# Patient Record
Sex: MALE | Race: WHITE | Employment: OTHER | URBAN - METROPOLITAN AREA
[De-identification: names, ages, dates, MRNs, and addresses within clinical notes are randomized per-mention and may not be internally consistent; named-entity substitution may affect disease eponyms.]

---

## 2017-01-17 ENCOUNTER — OFFICE VISIT (OUTPATIENT)
Dept: FAMILY MEDICINE CLINIC | Age: 77
End: 2017-01-17

## 2017-01-17 ENCOUNTER — HOSPITAL ENCOUNTER (OUTPATIENT)
Dept: LAB | Age: 77
Discharge: HOME | End: 2017-01-17
Payer: MEDICARE

## 2017-01-17 VITALS
TEMPERATURE: 97.3 F | WEIGHT: 147.2 LBS | OXYGEN SATURATION: 100 % | RESPIRATION RATE: 16 BRPM | HEART RATE: 64 BPM | SYSTOLIC BLOOD PRESSURE: 116 MMHG | HEIGHT: 65 IN | DIASTOLIC BLOOD PRESSURE: 69 MMHG | BODY MASS INDEX: 24.53 KG/M2

## 2017-01-17 DIAGNOSIS — I10 ESSENTIAL HYPERTENSION: ICD-10-CM

## 2017-01-17 DIAGNOSIS — N18.3 CKD (CHRONIC KIDNEY DISEASE), STAGE 3 (MODERATE): ICD-10-CM

## 2017-01-17 DIAGNOSIS — F43.29 STRESS AND ADJUSTMENT REACTION: ICD-10-CM

## 2017-01-17 DIAGNOSIS — Z78.9: ICD-10-CM

## 2017-01-17 DIAGNOSIS — Z13.220 LIPID SCREENING: ICD-10-CM

## 2017-01-17 DIAGNOSIS — C61 PROSTATE CA (HCC): ICD-10-CM

## 2017-01-17 DIAGNOSIS — Z78.9 HEALTH MAINTENANCE ALTERATION: Primary | ICD-10-CM

## 2017-01-17 LAB
ALBUMIN UR QL STRIP: 30 MG/L
CREATININE, URINE POC: 50 MG/DL
MICROALBUMIN/CREAT RATIO POC: NORMAL MG/G

## 2017-01-17 PROCEDURE — 85027 COMPLETE CBC AUTOMATED: CPT

## 2017-01-17 PROCEDURE — 80053 COMPREHEN METABOLIC PANEL: CPT

## 2017-01-17 PROCEDURE — 84153 ASSAY OF PSA TOTAL: CPT

## 2017-01-17 PROCEDURE — 82465 ASSAY BLD/SERUM CHOLESTEROL: CPT

## 2017-01-17 PROCEDURE — 83718 ASSAY OF LIPOPROTEIN: CPT

## 2017-01-17 PROCEDURE — 83721 ASSAY OF BLOOD LIPOPROTEIN: CPT

## 2017-01-17 PROCEDURE — 36415 COLL VENOUS BLD VENIPUNCTURE: CPT

## 2017-01-17 RX ORDER — AMLODIPINE BESYLATE 5 MG/1
TABLET ORAL
Qty: 90 TAB | Refills: 3 | Status: SHIPPED | OUTPATIENT
Start: 2017-01-17 | End: 2018-03-08 | Stop reason: SDUPTHER

## 2017-01-17 RX ORDER — ATENOLOL 50 MG/1
TABLET ORAL
Qty: 90 TAB | Refills: 3 | Status: SHIPPED | OUTPATIENT
Start: 2017-01-17 | End: 2018-04-12 | Stop reason: ALTCHOICE

## 2017-01-17 NOTE — PROGRESS NOTES
Army Hogan is a 67 y.o. male      Issues discussed today include:        Signs and symptoms:  remendous stress  Duration:  weeks  Context:  His wife had knee injury then developed a PE and had lenghty hospitalization and rehab. She is still out of town in [place]  Location:  Patient is living with his son in [place] most days but commutes back home twice a week  Quality:  ++stress but no despair  Severity:  no SI/HI  Timing:  She is getting gbetter and he feels more at ease  Modifying factors:  He declined Rx or counseling    Data reviewed or ordered today:  labs    WELLNESS 2017    PSA: 2015 <0.1     Colonoscopy:   FOBT:   TDAP: 2015  Pneumovax:   Flu shot:   Eye exam:   EK-14     FTF for DME: done: none  Advanced directive: Full code  Specialists: Armida Hadley  CKD Makayla Bocanegra fax #[phone]  GI Rian    Other problems include:  Patient Active Problem List   Diagnosis Code    HTN (hypertension) I10    Gout M10.9    ED (erectile dysfunction) N52.9    Microcytosis R71.8    THALASSEMIA MINOR     Prostatism N40.0    Prostate CA ([address] C61    S/P colonoscopy Z98.890    Esophagitis K20.9    Diverticula of colon K57.30    Anemia D64.9    Nonspecific abnormal electrocardiogram (ECG) (EKG) R94.31    CKD (chronic kidney disease) N18.9    Microalbuminuria R80.9    Screening for AAA (abdominal aortic aneurysm) Z13.6    Age appropriate mental status Z78.9       Medications:  Current Outpatient Prescriptions   Medication Sig Dispense Refill    atenolol (TENORMIN) 50 mg tablet TAKE 1 TABLET DAILY (NEED OFFICE VISIT) 90 Tab 3    amLODIPine (NORVASC) 5 mg tablet TAKE 1 TABLET DAILY (NEED OFFICE VISIT) 90 Tab 3    allopurinol (ZYLOPRIM) 300 mg tablet TAKE 1 TABLET DAILY 90 Tab 3       Allergies: Allergies   Allergen Reactions    Lisinopril Other (comments)     Increase creatinine    Probenecid Other (comments)     decrease renal function       LMP:  No LMP for male patient.     Social History Social History    Marital status:      Spouse name: N/A    Number of children: N/A    Years of education: N/A     Occupational History    Not on file. Social History Main Topics    Smoking status: Never Smoker    Smokeless tobacco: Never Used    Alcohol use No    Drug use: No    Sexual activity: Yes     Partners: Female     Other Topics Concern    Not on file     Social History Narrative         History reviewed. No pertinent family history. Other family history:  Wife ill currently    Meaningful use:  done      ROS:  Headaches:  no  Chest Pain:  no  SOB:  no  Fevers:  no  Other significant ROS:  No SI/HI, no falls or depression, MMSE normal today    No LMP for male patient. Physical Exam  Visit Vitals    /69 (BP 1 Location: Left arm, BP Patient Position: Sitting)    Pulse 64    Temp 97.3 °F (36.3 °C) (Oral)    Resp 16    Ht 5' 5\" (1.651 m)    Wt 147 lb 3.2 oz (66.8 kg)    SpO2 100%    BMI 24.5 kg/m2     BP Readings from Last 3 Encounters:   01/17/17 116/69   11/12/16 114/70   11/06/15 112/59     Constitutional:  Appears well,  No Acute Distress, Vitals noted  Psychiatric:   Affect normal, Alert and Oriented to person/place/time    Eyes:   Pupils equally round and reactive, EOMI, conjunctiva clear, eyelids normal  ENT:   External ears and nose normal/lips, teeth=OK/gums normal, TMs and Orophyarynx normal  Neck:   general inspection and Thyroid normal.  No abnormal cervical or supraclavicular nodes    Lungs:   clear to auscultation, good respiratory effort  Heart: Ausculation normal.  Regular rhythm. No cardiac murmurs.   No carotid bruits or palpable thrills  Chest wall normal    Extremities:   without edema, good peripheral pulses  Skin:   Warm to palpation, without rashes, bruising, or suspicious lesions     Neuro:  No facial droop, speech fluent, EOMI, Pupils equally round and reactive to light, visual fields seem OK, hearing seems normal and symmetrical,smile symmetrical, puffs out cheeks symmetrically    Shoulder shrug symmetrical     moves all extremities, strength/sensationseem intact and symmetrical    Rapid alternating movements of hands normal and symmetrical    balance seems OK, no pronator drift, gait normal. \"get up and go\" test OK    squats OK, heel standing/toe standing OK    no tenderness of C spine, T spine, LS spine, flexion/extension of spine OK    Affect seems appropriate, no obvious mental processing problems    MSK:  Full ROM all joints    MMSE normal            Assessment:    Patient Active Problem List   Diagnosis Code    HTN (hypertension) I10    Gout M10.9    ED (erectile dysfunction) N52.9    Microcytosis R71.8    THALASSEMIA MINOR     Prostatism N40.0    Prostate CA ([address] C61    S/P colonoscopy Z98.890    Esophagitis K20.9    Diverticula of colon K57.30    Anemia D64.9    Nonspecific abnormal electrocardiogram (ECG) (EKG) R94.31    CKD (chronic kidney disease) N18.9    Microalbuminuria R80.9    Screening for AAA (abdominal aortic aneurysm) Z13.6    Age appropriate mental status Z78.9       Today's diagnoses are:    ICD-10-CM ICD-9-CM    1. Health maintenance alteration Z78.9 V49.89    2. Prostate CA (HCC) C61 185 PSA DIAGNOSTIC (PROSTATIC SPECIFIC AG)   3. CKD (chronic kidney disease), stage 3 (moderate) N18.3 585.3 CBC W/O DIFF      METABOLIC PANEL, COMPREHENSIVE      AMB POC URINE, MICROALBUMIN, SEMIQUANT (3 RESULTS)   4. Lipid screening Z13.220 V77.91 LDL, DIRECT      CHOLESTEROL, HDL      CHOLESTEROL, TOTAL   5. Age appropriate mental status Z78.9 V49.89    6. Stress and adjustment reaction F43.29 309.89 REFERRAL TO [facility] PROGRAMS    wife s/p PE and now in rehab in [facility]   7.  Essential hypertension I10 401.9 atenolol (TENORMIN) 50 mg tablet      amLODIPine (NORVASC) 5 mg tablet       Plan:  Orders Placed This Encounter    PSA - PROSTATE SPECIFIC AG    CBC W/O DIFF    METABOLIC PANEL, COMPREHENSIVE    LDL, DIRECT    CHOLESTEROL, HDL    CHOLESTEROL, TOTAL    REFERRAL TO [facility] PROGRAMS     Referral Priority:   Routine     Referral Type:   Consultation     Referral Reason:   Specialty Services Required    AMB POC URINE, MICROALBUMIN, SEMIQUANT (3 RESULTS)    atenolol (TENORMIN) 50 mg tablet     Sig: TAKE 1 TABLET DAILY (NEED OFFICE VISIT)     Dispense:  90 Tab     Refill:  3    amLODIPine (NORVASC) 5 mg tablet     Sig: TAKE 1 TABLET DAILY (NEED OFFICE VISIT)     Dispense:  90 Tab     Refill:  3       See patient instructions  Patient Instructions   In general, I advise patients to be as active as possible. I believe exercise is the key to long life and good health. The current recommendation is for individuals to exercise for 150 minutes each week (in other words 30 minutes 5 days a week). Exercise should be vigorous enough to work up a sweat. These activities include brisk walking, running, tennis, swimming, weight-lifting, etc.     I usually tell folks that work is work and exercise is exercise. Each of these activities has a different goal.  So build dedicated exercise time into your routine. Eat more protein (egg whites, beans, and nuts you know for sure that you tolerate) and less carbohydrates (white bread, white rice, white pasta, white potatoes, sodas, and sweets). Eat appropriately small portion sizes. You may also wish to look into the Mediterranean or the DASH Diet:     If any patient drinks alcohol, I typically suggest that a male drink no more than 2 beers (or glasses of wine, or shots of liquor) in any 24 hour period (and not daily). For females, the limits are one drink per 24 hours (and not daily). After these limits, the toxic effects of alcohol consumption start to manifest.     Avoid tobacco products. I can provide separate instructions for smoking cessation strategies if needed.     I suggest a wellness exam yearly during your birth month to update health maintenance issues such as fasting labs, EKGs and other studies, appropriate cancer screenings,  immunizations, etc.      I suggest a yearly flu shot    If needed, please call Anjelica Stevens to help arrange and authorize any tests or referrals. Her # is [phone]. Tests at [facility] can be scheduled by calling #[phone].     Follow up with your specialists    Your SLUMS test for mentation was normal today              refresh note:  done    AVS Printed:  done    Fax labs to Dr. Kenyatta Perez

## 2017-01-17 NOTE — MR AVS SNAPSHOT
Visit Information Date & Time Provider Department Dept. Phone Encounter #  
 1/17/2017  8:15 AM Markie Min MD [address] [place] [address] [phone] [id number] Upcoming Health Maintenance Date Due  
 GLAUCOMA SCREENING Q2Y 9/14/2017 MEDICARE YEARLY EXAM 1/18/2018 DTaP/Tdap/Td series (2 - Td) 9/14/2025 Allergies as of 1/17/2017  Review Complete On: 1/17/2017 By: Trip Lopez LPN Severity Noted Reaction Type Reactions Lisinopril  06/11/2010    Other (comments) Increase creatinine Probenecid  06/11/2010    Other (comments) decrease renal function Current Immunizations  Reviewed on 11/12/2016 Name Date Influenza High Dose Vaccine PF 11/12/2016, 9/14/2015 Influenza Vaccine Split 10/23/2008 Pneumococcal Vaccine (Pcv) 10/4/2007 Tdap 9/14/2015 Tetanus Vaccine 10/4/2007 Not reviewed this visit You Were Diagnosed With   
  
 Codes Comments Health maintenance alteration    -  Primary ICD-10-CM: Z78.9 ICD-9-CM: V49.89 Prostate [facility])     ICD-10-CM: A77 ICD-9-CM: 292 CKD (chronic kidney disease), stage 3 (moderate)     ICD-10-CM: N18.3 ICD-9-CM: 876. 3 Lipid screening     ICD-10-CM: Q43.728 ICD-9-CM: V77.91 Age appropriate mental status     ICD-10-CM: Z68.5 ICD-9-CM: V49.89 Vitals BP Pulse Temp Resp Height(growth percentile) Weight(growth percentile) 116/69 (BP 1 Location: Left arm, BP Patient Position: Sitting) 64 97.3 °F (36.3 °C) (Oral) 16 5' 5\" (1.651 m) 147 lb 3.2 oz (66.8 kg) SpO2 BMI Smoking Status 100% 24.5 kg/m2 Never Smoker Vitals History BMI and BSA Data Body Mass Index Body Surface Area 24.5 kg/m 2 1.75 m 2 Preferred Pharmacy Pharmacy Name Phone [address] [facility] [phone] Your Updated Medication List  
  
   
 This list is accurate as of: 1/17/17  9:16 AM.  Always use your most recent med list.  
  
  
  
  
 allopurinol 300 mg tablet Commonly known as:  ZYLOPRIM  
TAKE 1 TABLET DAILY  
  
 amLODIPine 5 mg tablet Commonly known as:  Chávez Fanti TAKE 1 TABLET DAILY (NEED OFFICE VISIT) atenolol 50 mg tablet Commonly known as:  TENORMIN  
TAKE 1 TABLET DAILY (NEED OFFICE VISIT) We Performed the Following AMB POC URINE, MICROALBUMIN, SEMIQUANT (3 RESULTS) [60643 CPT(R)] CBC W/O DIFF [88307 CPT(R)] CHOLESTEROL, HDL [id number] CPT(R)] CHOLESTEROL, TOTAL [77587 CPT(R)] LDL, DIRECT [id number] CPT(R)] METABOLIC PANEL, COMPREHENSIVE [08258 CPT(R)] PSA DIAGNOSTIC (PROSTATIC SPECIFIC AG) [id number] CPT(R)] Patient Instructions In general, I advise patients to be as active as possible. I believe exercise is the key to long life and good health. The current recommendation is for individuals to exercise for 150 minutes each week (in other words 30 minutes 5 days a week). Exercise should be vigorous enough to work up a sweat. These activities include brisk walking, running, tennis, swimming, weight-lifting, etc.  
 
I usually tell folks that work is work and exercise is exercise. Each of these activities has a different goal.  So build dedicated exercise time into your routine. Eat more protein (egg whites, beans, and nuts you know for sure that you tolerate) and less carbohydrates (white bread, white rice, white pasta, white potatoes, sodas, and sweets). Eat appropriately small portion sizes. You may also wish to look into the Mediterranean or the DASH Diet:  
 
If any patient drinks alcohol, I typically suggest that a male drink no more than 2 beers (or glasses of wine, or shots of liquor) in any 24 hour period (and not daily). For females, the limits are one drink per 24 hours (and not daily).   After these limits, the toxic effects of alcohol consumption start to manifest.  
 Avoid tobacco products. I can provide separate instructions for smoking cessation strategies if needed. I suggest a wellness exam yearly during your birth month to update health maintenance issues such as fasting labs, EKGs and other studies, appropriate cancer screenings,  immunizations, etc.   
 
I suggest a yearly flu shot If needed, please call Dwaine Edwards to help arrange and authorize any tests or referrals. Her # is [phone]. Tests at [facility] can be scheduled by calling #[phone]. Follow up with your specialists Your [facility] test for mentation was normal today Introducing [facility]! Dear Jani Severs: Thank you for requesting a [employer] account. Our records indicate that you already have an active [employer]outNow account. You can access your account anytime at [email or web address] Did you know that you can access your hospital and ER discharge instructions at any time in [employer]Now? You can also review all of your test results from your hospital stay or ER visit. Additional Information If you have questions, please visit the Frequently Asked Questions section of the [employer] website at [email or web address] Remember, [employer]Now is NOT to be used for urgent needs. For medical emergencies, dial 911. Now available from your iPhone and Android! Please provide this summary of care documentation to your next provider. Your primary care clinician is listed as Genesis Denise. If you have any questions after today's visit, please call [phone].

## 2017-01-17 NOTE — PATIENT INSTRUCTIONS
In general, I advise patients to be as active as possible. I believe exercise is the key to long life and good health. The current recommendation is for individuals to exercise for 150 minutes each week (in other words 30 minutes 5 days a week). Exercise should be vigorous enough to work up a sweat. These activities include brisk walking, running, tennis, swimming, weight-lifting, etc.     I usually tell folks that work is work and exercise is exercise. Each of these activities has a different goal.  So build dedicated exercise time into your routine. Eat more protein (egg whites, beans, and nuts you know for sure that you tolerate) and less carbohydrates (white bread, white rice, white pasta, white potatoes, sodas, and sweets). Eat appropriately small portion sizes. You may also wish to look into the Mediterranean or the DASH Diet:     If any patient drinks alcohol, I typically suggest that a male drink no more than 2 beers (or glasses of wine, or shots of liquor) in any 24 hour period (and not daily). For females, the limits are one drink per 24 hours (and not daily). After these limits, the toxic effects of alcohol consumption start to manifest.     Avoid tobacco products. I can provide separate instructions for smoking cessation strategies if needed. I suggest a wellness exam yearly during your birth month to update health maintenance issues such as fasting labs, EKGs and other studies, appropriate cancer screenings,  immunizations, etc.      I suggest a yearly flu shot    If needed, please call Riri Em to help arrange and authorize any tests or referrals. Her # is [phone]. Tests at [facility] facilities can be scheduled by calling #[phone].     Follow up with your specialists    Your SLUMS test for mentation was normal today

## 2017-01-17 NOTE — LETTER
1/18/2017 8:30 AM 
 
Mr. Curly Barger [address] Dear Curly Barger: 
 
Please find your most recent results below. Resulted Orders PSA DIAGNOSTIC (PROSTATIC SPECIFIC AG) Result Value Ref Range Prostate Specific Ag <0.1 0.0 - 4.0 ng/mL Comment:  
   Roche ECLIA methodology. According to the American Urological Association, Serum PSA should 
decrease and remain at undetectable levels after radical 
prostatectomy. The AUA defines biochemical recurrence as an initial 
PSA value 0.2 ng/mL or greater followed by a subsequent confirmatory PSA value 0.2 ng/mL or greater. Values obtained with different assay methods or kits cannot be used 
interchangeably. Results cannot be interpreted as absolute evidence 
of the presence or absence of malignant disease. Narrative Performed at:  [address]  [id number] : Douglas Shelby MD, Phone:  [phone] CBC W/O DIFF Result Value Ref Range WBC 7.4 3.4 - 10.8 x10E3/uL  
 RBC 5.51 4.14 - 5.80 x10E6/uL HGB 10.2 (L) 12.6 - 17.7 g/dL HCT 32.4 (L) 37.5 - 51.0 % MCV 59 (L) 79 - 97 fL  
 MCH 18.5 (L) 26.6 - 33.0 pg  
 MCHC 31.5 31.5 - 35.7 g/dL RDW 21.7 (H) 12.3 - 15.4 % PLATELET 482 027 - 733 x10E3/uL Narrative Performed at:  [address]  [id number] : Douglas Shelby MD, Phone:  [phone] METABOLIC PANEL, COMPREHENSIVE Result Value Ref Range Glucose 110 (H) 65 - 99 mg/dL BUN 42 (H) 8 - 27 mg/dL Creatinine 1.55 (H) 0.76 - 1.27 mg/dL GFR est non-AA 43 (L) >59 mL/min/1.73 GFR est AA 49 (L) >59 mL/min/1.73  
 BUN/Creatinine ratio 27 (H) 10 - 22 Sodium 140 134 - 144 mmol/L Potassium 4.2 3.5 - 5.2 mmol/L Chloride 100 96 - 106 mmol/L  
 CO2 21 18 - 29 mmol/L Calcium 9.8 8.6 - 10.2 mg/dL Protein, total 6.9 6.0 - 8.5 g/dL Albumin 4.6 3.5 - 4.8 g/dL GLOBULIN, TOTAL 2.3 1.5 - 4.5 g/dL A-G Ratio 2.0 1.1 - 2.5 Bilirubin, total 0.4 0.0 - 1.2 mg/dL Alk. phosphatase 64 39 - 117 IU/L  
 AST 22 0 - 40 IU/L  
 ALT 21 0 - 44 IU/L Narrative Performed at:  [address]  [id number] : Froilan Weiner MD, Phone:  [phone] LDL, DIRECT Result Value Ref Range LDL,Direct 109 (H) 0 - 99 mg/dL Narrative Performed at:  [address]  [id number] : Froilan Weiner MD, Phone:  [phone] CHOLESTEROL, HDL Result Value Ref Range HDL Cholesterol 54 >39 mg/dL Narrative Performed at:  [address]  [id number] : Froilan Weiner MD, Phone:  [phone] CHOLESTEROL, TOTAL Result Value Ref Range Cholesterol, total 170 100 - 199 mg/dL Narrative Performed at:  [address]  [id number] : Froilan Weiner MD, Phone:  [phone] AMB POC URINE, MICROALBUMIN, SEMIQUANT (3 RESULTS) Result Value Ref Range ALBUMIN, URINE POC 30 Negative mg/L  
 CREATININE, URINE POC 50 mg/dL Microalbumin/creat ratio (POC)  mg/g Comment:  
   Abnormal  
CKD REPORT Result Value Ref Range Interpretation Note Comment:  
   Supplement report is available. Narrative Performed at:  [facility] [place] [address]  [id number] : Estelle Barrett PhD, Phone:  [phone] CVD REPORT Result Value Ref Range INTERPRETATION NTAP Narrative Performed at:  [address] [place] [address]  [id number] : Estelle Barrett PhD, Phone:  [phone] Your anemia is a bit worse this year.  Did you get a colonoscopy in 2015?  I see that a referral was done but I see no report from Dr. Hand Stacks you check with Dr. Twila Hastings (#[phone]) to check on this? Return your stool cards so we can check your stool for blood. Your kidney function is about the same.  I will fax these labs tod Dr. Chaz Busby. Your cholesterol is a bit high. Focus on regular exercise (150 minutes each week) and healthy eating.  Eat more fruits and vegetables.  Eat more protein (egg whites, beans, and nuts you know you tolerate) and less carbohydrates (white bread, white rice, white pasta, white potatoes, sodas, and sweets).  Eat appropriately small portion sizes. Sincerely, Bari Arcos MD

## 2017-01-18 PROBLEM — E78.9 LIPID DISORDER: Status: ACTIVE | Noted: 2017-01-18

## 2017-01-18 LAB
ALBUMIN SERPL-MCNC: 4.6 G/DL (ref 3.5–4.8)
ALBUMIN/GLOB SERPL: 2 {RATIO} (ref 1.1–2.5)
ALP SERPL-CCNC: 64 IU/L (ref 39–117)
ALT SERPL-CCNC: 21 IU/L (ref 0–44)
AST SERPL-CCNC: 22 IU/L (ref 0–40)
BILIRUB SERPL-MCNC: 0.4 MG/DL (ref 0–1.2)
BUN SERPL-MCNC: 42 MG/DL (ref 8–27)
BUN/CREAT SERPL: 27 (ref 10–22)
CALCIUM SERPL-MCNC: 9.8 MG/DL (ref 8.6–10.2)
CHLORIDE SERPL-SCNC: 100 MMOL/L (ref 96–106)
CHOLEST SERPL-MCNC: 170 MG/DL (ref 100–199)
CO2 SERPL-SCNC: 21 MMOL/L (ref 18–29)
CREAT SERPL-MCNC: 1.55 MG/DL (ref 0.76–1.27)
ERYTHROCYTE [DISTWIDTH] IN BLOOD BY AUTOMATED COUNT: 21.7 % (ref 12.3–15.4)
GLOBULIN SER CALC-MCNC: 2.3 G/DL (ref 1.5–4.5)
GLUCOSE SERPL-MCNC: 110 MG/DL (ref 65–99)
HCT VFR BLD AUTO: 32.4 % (ref 37.5–51)
HDLC SERPL-MCNC: 54 MG/DL
HGB BLD-MCNC: 10.2 G/DL (ref 12.6–17.7)
INTERPRETATION, 910389: NORMAL
INTERPRETATION: NORMAL
LDLC SERPL DIRECT ASSAY-MCNC: 109 MG/DL (ref 0–99)
MCH RBC QN AUTO: 18.5 PG (ref 26.6–33)
MCHC RBC AUTO-ENTMCNC: 31.5 G/DL (ref 31.5–35.7)
MCV RBC AUTO: 59 FL (ref 79–97)
PLATELET # BLD AUTO: 179 X10E3/UL (ref 150–379)
POTASSIUM SERPL-SCNC: 4.2 MMOL/L (ref 3.5–5.2)
PROT SERPL-MCNC: 6.9 G/DL (ref 6–8.5)
PSA SERPL-MCNC: <0.1 NG/ML (ref 0–4)
RBC # BLD AUTO: 5.51 X10E6/UL (ref 4.14–5.8)
SODIUM SERPL-SCNC: 140 MMOL/L (ref 134–144)
WBC # BLD AUTO: 7.4 X10E3/UL (ref 3.4–10.8)

## 2017-01-18 NOTE — PROGRESS NOTES
Your anemia is a bit worse this year. Did you get a colonoscopy in 2015? I see that a referral was done but I see no report from Dr. Terrence Hawkins. Can you check with Dr. Terrence Hawkins (#[phone]) to check on this? Return your stool cards so we can check your stool for blood. Your kidney function is about the same. I will fax these labs tod Dr. Lavinia Merida. Your cholesterol is a bit high. Focus on regular exercise (150 minutes each week) and healthy eating. Eat more fruits and vegetables. Eat more protein (egg whites, beans, and nuts you know you tolerate) and less carbohydrates (white bread, white rice, white pasta, white potatoes, sodas, and sweets). Eat appropriately small portion sizes.

## 2017-01-24 ENCOUNTER — HOSPITAL ENCOUNTER (OUTPATIENT)
Dept: LAB | Age: 77
Discharge: HOME | End: 2017-01-24
Payer: MEDICARE

## 2017-01-24 PROCEDURE — 82270 OCCULT BLOOD FECES: CPT

## 2017-02-01 LAB — HEMOCCULT STL QL IA: NEGATIVE

## 2017-04-03 RX ORDER — ALLOPURINOL 300 MG/1
TABLET ORAL
Qty: 90 TAB | Refills: 2 | Status: SHIPPED | OUTPATIENT
Start: 2017-04-03 | End: 2017-12-29 | Stop reason: SDUPTHER

## 2018-01-02 RX ORDER — ALLOPURINOL 300 MG/1
TABLET ORAL
Qty: 90 TAB | Refills: 2 | Status: SHIPPED | OUTPATIENT
Start: 2018-01-02 | End: 2018-09-29 | Stop reason: SDUPTHER

## 2018-04-12 ENCOUNTER — HOSPITAL ENCOUNTER (OUTPATIENT)
Dept: LAB | Age: 78
Discharge: HOME | End: 2018-04-12
Payer: MEDICARE

## 2018-04-12 ENCOUNTER — OFFICE VISIT (OUTPATIENT)
Dept: FAMILY MEDICINE CLINIC | Age: 78
End: 2018-04-12

## 2018-04-12 VITALS
RESPIRATION RATE: 18 BRPM | TEMPERATURE: 97.7 F | SYSTOLIC BLOOD PRESSURE: 128 MMHG | OXYGEN SATURATION: 99 % | WEIGHT: 153 LBS | HEIGHT: 65 IN | HEART RATE: 62 BPM | BODY MASS INDEX: 25.49 KG/M2 | DIASTOLIC BLOOD PRESSURE: 77 MMHG

## 2018-04-12 DIAGNOSIS — Z13.31 SCREENING FOR DEPRESSION: ICD-10-CM

## 2018-04-12 DIAGNOSIS — E78.9 LIPID DISORDER: ICD-10-CM

## 2018-04-12 DIAGNOSIS — M10.9 GOUT, UNSPECIFIED CAUSE, UNSPECIFIED CHRONICITY, UNSPECIFIED SITE: ICD-10-CM

## 2018-04-12 DIAGNOSIS — N18.30 STAGE 3 CHRONIC KIDNEY DISEASE (HCC): ICD-10-CM
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
## 2018-04-12 DIAGNOSIS — Z00.00 HEALTH CARE MAINTENANCE: Primary | ICD-10-CM

## 2018-04-12 DIAGNOSIS — D56.3 THALASSEMIA MINOR: ICD-10-CM

## 2018-04-12 DIAGNOSIS — I10 ESSENTIAL HYPERTENSION: ICD-10-CM

## 2018-04-12 DIAGNOSIS — Z78.9: ICD-10-CM

## 2018-04-12 DIAGNOSIS — C61 PROSTATE CA (HCC): ICD-10-CM

## 2018-04-12 PROCEDURE — 80053 COMPREHEN METABOLIC PANEL: CPT

## 2018-04-12 PROCEDURE — 84153 ASSAY OF PSA TOTAL: CPT

## 2018-04-12 PROCEDURE — 85018 HEMOGLOBIN: CPT

## 2018-04-12 PROCEDURE — 80061 LIPID PANEL: CPT

## 2018-04-12 PROCEDURE — 36415 COLL VENOUS BLD VENIPUNCTURE: CPT

## 2018-04-12 RX ORDER — METOPROLOL SUCCINATE 25 MG/1
25 TABLET, EXTENDED RELEASE ORAL DAILY
Qty: 90 TAB | Refills: 3 | Status: SHIPPED | OUTPATIENT
Start: 2018-04-12

## 2018-04-12 NOTE — PROGRESS NOTES
Esthela Snowden is a 65 y.o. male      Issues discussed today include:        Signs and symptoms:  He had a fall 12/26/2017 carrying gifts up the stairs  Duration:  None since this stumble  Context:  He had stiches for elbow injury at that time and saw ortho  Location:  Left elbow  Quality:  Elbow fine now  Severity:  He is active and does not fear further falls  Timing:  No more falls  Modifying factors:  He declines PT for fall prevention    Data reviewed or ordered today:  Fasting labs    WELLNESS     PSA:  2018  AAA screen:   Never smoker  Screening chest CT scan:   Never smoker    Hearing screen:   done  Vision screening:   done  Depression screening:   Done, PHQ9 = 0  Fall assessment:   done      We discussed health maintenance    BMI = Body mass index is 25.46 kg/(m^2). We discussed diet/exercise/healthy weight    We reviewed and updated pertinent past medical history in the problem list      Colonoscopy:  2015, none further planned  TDAP:  2015  Pneumovax:  2007  PCV-13:  2017  Flu shot:  2017  Zostavax:  declined  Eye exam:  2018  EKG: On file    FTF for DME:  done:  non  Advanced directive:  discussed with lucid patient 4/12/2018:  FULL CODE. Isai Saeed would be decision-maker if needed [phone]  Specialists:  GI Rian  CKD Rexie Distad [facility] OP Renea Oppenheim    In general, I advise patients to be as active as possible. I believe exercise is the key to long life and good health. The current recommendation is for individuals to exercise for 150 minutes each week (in other words 30 minutes 5 days a week). Exercise should be vigorous enough to work up a sweat. These activities include brisk walking, running, tennis, swimming, weight-lifting, etc.     I usually tell folks that work is work and exercise is exercise. Each of these activities has a different goal.  Even though you may be active at work, it may not be aerobically adequate.   So build dedicated exercise time into your weekly routine. If any patient drinks alcohol, I suggest that a male drink only 2 beers (or glasses of wine, or shots of liquor) in any 24 hour period ( and not daily). For females, the limits are one drink per 24 hours (and not daily). After these limits, the toxic effects of alcohol consumption start to manifest.     Avoid tobacco products. I may suggest specific smoking cessation instructions if needed. I typically suggest a wellness exam yearly during your birth month to update health maintenance issues such as fasting labs, EKGs and other studies, appropriate cancer screenings,  female exams as appropriate, immunizations, etc.    I suggest a yearly flu shot    Please call Jeanine Naidu to help arrange and authorize any tests or referrals. Her # is [phone]         Other problems include:  Patient Active Problem List   Diagnosis Code    HTN (hypertension) I10    Gout M10.9    ED (erectile dysfunction) N52.9    Microcytosis R71.8    Thalassemia minor D56.3    Prostate CA ([address] C61    S/P colonoscopy Z98.890    Esophagitis K20.9    Diverticula of colon K57.30    Anemia D64.9    Nonspecific abnormal electrocardiogram (ECG) (EKG) R94.31    CKD (chronic kidney disease) N18.9    Microalbuminuria R80.9    Screening for AAA (abdominal aortic aneurysm) Z13.6    Age appropriate mental status Z78.9    Lipid disorder E78.9       Medications:  Current Outpatient Prescriptions   Medication Sig Dispense Refill    metoprolol succinate (TOPROL-XL) 25 mg XL tablet Take 1 Tab by mouth daily. 90 Tab 3    amLODIPine (NORVASC) 5 mg tablet TAKE 1 TABLET DAILY (NEED OFFICE VISIT) 90 Tab 0    allopurinol (ZYLOPRIM) 300 mg tablet TAKE 1 TABLET DAILY 90 Tab 2       Allergies: Allergies   Allergen Reactions    Lisinopril Other (comments)     Increase creatinine    Probenecid Other (comments)     decrease renal function       LMP:  No LMP for male patient.     Social History     Social History    Marital status:      Spouse name: N/A    Number of children: N/A    Years of education: N/A     Occupational History    Not on file. Social History Main Topics    Smoking status: Never Smoker    Smokeless tobacco: Never Used    Alcohol use No    Drug use: No    Sexual activity: Yes     Partners: Female     Other Topics Concern    Not on file     Social History Narrative         History reviewed. No pertinent family history. Other family history:  Wife was critically ill recently but is now home s/p rehab    Meaningful use:  done      ROS:  Headaches:  no  Chest Pain:  no  SOB:  no  Fevers:  no  Falls: One as above  anxiety/depression/losing interest in doing things that were previously enjoyed:  no. PHQ2 = 0  Other significant ROS:    Patient denied problems with:    Hearing/vision, speaking/swallowing, Reflux/indigestion, Cough,Diarrhea/constipation,Problems passing or controlling urine, Mood (anxiety/depression/losing interest in doing things that were previously enjoyed), Snoring/sleep apnea,Fatigue, Weight change, memory                                                         Any other Positive ROS include: stress of wife's illness        Falls in the past 12 months:  Yes, he declines PT for fall prevention           Over the last year (or since your last visit):  Have you been diagnosed with heart attack, stroke, broken bones, or skin cancer = no    Exercise:  Needs more             Smoking history:  none                                    No LMP for male patient.     Physical Exam  Visit Vitals    /77 (BP 1 Location: Right arm, BP Patient Position: Sitting)    Pulse 62    Temp 97.7 °F (36.5 °C) (Oral)    Resp 18    Ht 5' 5\" (1.651 m)    Wt 153 lb (69.4 kg)    SpO2 99%    BMI 25.46 kg/m2     BP Readings from Last 3 Encounters:   04/12/18 128/77   01/17/17 116/69   11/12/16 114/70     Constitutional:  Appears well,  No Acute Distress, Vitals noted  Psychiatric:   Affect normal, Alert and cooperative, Oriented to person/place/time    Eyes:   Pupils equally round and reactive, EOMI, conjunctiva clear, eyelids normal  ENT:   External ears and nose normal/lips, teeth=OK/gums normal, TMs and Orophyarynx normal  Neck:   general inspection and Thyroid normal.  No abnormal cervical or supraclavicular nodes    Lungs:   clear to auscultation, good respiratory effort  Heart: Ausculation normal.  Regular rhythm. No cardiac murmurs.   No carotid bruits or palpable thrills  Chest wall normal  Abd:  benign  Extremities:   without edema, good peripheral pulses  Skin:   Warm to palpation, without rashes, bruising, or suspicious lesions one benign looking skin tag on back    Neuro:  No facial droop, speech fluent, EOMI, Pupils equally round and reactive to light, visual fields seem OK, hearing seems normal and symmetrical,smile symmetrical, puffs out cheeks symmetrically    Shoulder shrug symmetrical     moves all extremities, strength/sensation seems intact and symmetrical    Rapid alternating movements of hands normal and symmetrical    balance seems OK, no pronator drift, gait normal. \"get up and go\" test OK    squats OK, heel standing/toe standing OK    no tenderness of C spine, T spine, LS spine, flexion/extension of spine OK    Affect seems appropriate, no obvious mental processing problems    MSK:  Full passive ROM all joints, left elbow looks normal              Assessment:    Patient Active Problem List   Diagnosis Code    HTN (hypertension) I10    Gout M10.9    ED (erectile dysfunction) N52.9    Microcytosis R71.8    Thalassemia minor D56.3    Prostate CA ([address] C61    S/P colonoscopy Z98.890    Esophagitis K20.9    Diverticula of colon K57.30    Anemia D64.9    Nonspecific abnormal electrocardiogram (ECG) (EKG) R94.31    CKD (chronic kidney disease) N18.9    Microalbuminuria R80.9    Screening for AAA (abdominal aortic aneurysm) Z13.6    Age appropriate mental status Z78.9    Lipid disorder E78.9       Today's diagnoses are:    ICD-10-CM ICD-9-CM    1. Health care maintenance Z00.00 V70.0    2. Lipid disorder E78.9 272.9 LIPID PANEL   3. Age appropriate mental status Z78.9 V49.89    4. Stage 3 chronic kidney disease E90.0 952.1 METABOLIC PANEL, COMPREHENSIVE    Jordi Smith   5. Gout, unspecified cause, unspecified chronicity, unspecified site M10.9 274.9    6. Prostate CA (HCC) C61 185 PSA, DIAGNOSTIC (PROSTATE SPECIFIC AG)   7. Thalassemia minor D56.3 282.46 HGB & HCT   8. Essential hypertension I10 401.9 metoprolol succinate (TOPROL-XL) 25 mg XL tablet   9. Screening for depression Z13.89 V79.0 OH DEPRESSION SCREEN ANNUAL   10. Fall (on) (from) other stairs and steps, initial encounter W10. 8XXA E880.9 OH FALLS RISK ASSESSMENT DOCUMENTED       Plan:  Orders Placed This Encounter    PROSTATE SPECIFIC AG    HGB & HCT    METABOLIC PANEL, COMPREHENSIVE    LIPID PANEL    OH DEPRESSION SCREEN ANNUAL    OH FALLS RISK ASSESSMENT DOCUMENTED    metoprolol succinate (TOPROL-XL) 25 mg XL tablet     Sig: Take 1 Tab by mouth daily. Dispense:  90 Tab     Refill:  3     This replaces atenolol       See patient instructions  Patient Instructions   Labs today    Stop atenolol and start metoprolol one pill daily (when this is available)    Recheck BP in 2-4 weeks    Stay active    Do no fall          refresh note:  done    AVS Printed:  done    Diagnoses and all orders for this visit:    1. Health care maintenance    2. Lipid disorder  -     LIPID PANEL    3. Age appropriate mental status    4. Stage 3 chronic kidney disease  Comments:  Jordi Smith  Orders:  -     METABOLIC PANEL, COMPREHENSIVE    5. Gout, unspecified cause, unspecified chronicity, unspecified site    6. Prostate CA [facility])  Assessment & Plan: This condition is managed by Specialist.  Key Oncology Meds     Patient is on no Oncologic meds. Key Pain Meds     The patient is on no pain meds.         Lab Results   Component Value Date/Time    WBC 7.4 01/17/2017 09:23 AM    HGB 10.2 01/17/2017 09:23 AM    HCT 32.4 01/17/2017 09:23 AM    PLATELET 910 00/92/2646 09:23 AM    Creatinine 1.55 01/17/2017 09:23 AM    BUN 42 01/17/2017 09:23 AM    ALT (SGPT) 21 01/17/2017 09:23 AM    AST (SGOT) 22 01/17/2017 09:23 AM    Albumin 4.6 01/17/2017 09:23 AM    Prostate Specific Ag <0.1 01/17/2017 09:23 AM       Orders:  -     PROSTATE SPECIFIC AG    7. Thalassemia minor  -     HGB & HCT    8. Essential hypertension  -     metoprolol succinate (TOPROL-XL) 25 mg XL tablet; Take 1 Tab by mouth daily. 9. Screening for depression  -     [address] [address]    10.  Fall (on) (from) other stairs and steps, initial encounter  -     Yane Andrade

## 2018-04-12 NOTE — LETTER
4/16/2018 1:18 PM 
 
Mr. Javy Fischer [address] Amari Werner [address] Dear Javy Fischer: 
 
Please find your most recent results below. Resulted Orders PSA, DIAGNOSTIC (PROSTATE SPECIFIC AG) Result Value Ref Range Prostate Specific Ag <0.1 0.0 - 4.0 ng/mL Comment:  
   Roche ECLIA methodology. According to the American Urological Association, Serum PSA should 
decrease and remain at undetectable levels after radical 
prostatectomy. The AUA defines biochemical recurrence as an initial 
PSA value 0.2 ng/mL or greater followed by a subsequent confirmatory PSA value 0.2 ng/mL or greater. Values obtained with different assay methods or kits cannot be used 
interchangeably. Results cannot be interpreted as absolute evidence 
of the presence or absence of malignant disease. Narrative Performed at:  [address]  [id number] : Enrique Leung MD, Phone:  [phone] HGB & HCT Result Value Ref Range HGB 10.2 (L) 13.0 - 17.7 g/dL HCT 33.5 (L) 37.5 - 51.0 % Narrative Performed at:  [address]  [id number] : Enrique Leung MD, Phone:  [phone] METABOLIC PANEL, COMPREHENSIVE Result Value Ref Range Glucose 98 65 - 99 mg/dL BUN 37 (H) 8 - 27 mg/dL Creatinine 1.45 (H) 0.76 - 1.27 mg/dL GFR est non-AA 46 (L) >59 mL/min/1.73 GFR est AA 53 (L) >59 mL/min/1.73  
 BUN/Creatinine ratio 26 (H) 10 - 24 Sodium 142 134 - 144 mmol/L Potassium 4.7 3.5 - 5.2 mmol/L Chloride 101 96 - 106 mmol/L  
 CO2 25 18 - 29 mmol/L Calcium 9.8 8.6 - 10.2 mg/dL Protein, total 6.6 6.0 - 8.5 g/dL Albumin 4.4 3.5 - 4.8 g/dL GLOBULIN, TOTAL 2.2 1.5 - 4.5 g/dL A-G Ratio 2.0 1.2 - 2.2 Bilirubin, total 0.7 0.0 - 1.2 mg/dL Alk. phosphatase 48 39 - 117 IU/L  
 AST (SGOT) 18 0 - 40 IU/L  
 ALT (SGPT) 15 0 - 44 IU/L Narrative Performed at:  [address]  [id number] : Kj Kelley MD, Phone:  [phone] LIPID PANEL Result Value Ref Range Cholesterol, total 191 100 - 199 mg/dL Triglyceride 75 0 - 149 mg/dL HDL Cholesterol 60 >39 mg/dL VLDL, calculated 15 5 - 40 mg/dL LDL, calculated 116 (H) 0 - 99 mg/dL Narrative Performed at:  [address]  [id number] : Kj Kelley MD, Phone:  [phone] CKD REPORT Result Value Ref Range Interpretation Note Comment:  
   Supplemental report is available. Narrative Performed at:  [address] [place] [address]  [id number] : Jeremy Briones PhD, Phone:  [phone] CVD REPORT Result Value Ref Range INTERPRETATION Note Comment:  
   Supplemental report is available. PDF IMAGE Not applicable Narrative Performed at:  [address] [place] [address]  [id number] : Jeremy Briones PhD, Phone:  [phone] RECOMMENDATIONS: 
 
Your labs are stable. Your PSA is zero. stay active. Sincerely, Leanna Perez MD

## 2018-04-12 NOTE — ASSESSMENT & PLAN NOTE
This condition is managed by Specialist.  Key Oncology Meds     Patient is on no Oncologic meds. Key Pain Meds     The patient is on no pain meds.         Lab Results   Component Value Date/Time    WBC 7.4 01/17/2017 09:23 AM    HGB 10.2 01/17/2017 09:23 AM    HCT 32.4 01/17/2017 09:23 AM    PLATELET 219 90/78/1127 09:23 AM    Creatinine 1.55 01/17/2017 09:23 AM    BUN 42 01/17/2017 09:23 AM    ALT (SGPT) 21 01/17/2017 09:23 AM    AST (SGOT) 22 01/17/2017 09:23 AM    Albumin 4.6 01/17/2017 09:23 AM    Prostate Specific Ag <0.1 01/17/2017 09:23 AM

## 2018-04-12 NOTE — PATIENT INSTRUCTIONS
Labs today    Stop atenolol and start metoprolol one pill daily (when this is available)    Recheck BP in 2-4 weeks    Stay active    Do no fall

## 2018-04-12 NOTE — MR AVS SNAPSHOT
[address] 
[phone] Patient: David Montero MRN: [id number] HMY:[birth year] Visit Information Date & Time Provider Department Dept. Phone Encounter #  
 4/12/2018  8:00 AM Esther Gray MD [address] [place] [address] [phone] [id number] Follow-up Instructions Return in about 1 year (around 4/12/2019). Upcoming Health Maintenance Date Due Influenza Age 5 to Adult 4/12/2019* MEDICARE YEARLY EXAM 4/13/2019 GLAUCOMA SCREENING Q2Y 1/10/2020 DTaP/Tdap/Td series (2 - Td) 9/14/2025 *Topic was postponed. The date shown is not the original due date. Allergies as of 4/12/2018  Review Complete On: 4/12/2018 By: Esther Gray MD  
  
 Severity Noted Reaction Type Reactions Lisinopril  06/11/2010    Other (comments) Increase creatinine Probenecid  06/11/2010    Other (comments) decrease renal function Current Immunizations  Reviewed on 4/12/2018 Name Date Influenza High Dose Vaccine PF 11/12/2016, 9/14/2015 Influenza Vaccine 10/31/2017 Influenza Vaccine Split 10/23/2008 Pneumococcal Conjugate (PCV-13) 10/31/2017 Pneumococcal Vaccine (Pcv) 10/4/2007 Tdap 9/14/2015 Tetanus Vaccine 10/4/2007 Reviewed by Angie Douglass LPN on 6/35/8463 at  8:06 AM  
 Reviewed by Esther Gray MD on 4/12/2018 at  8:41 AM  
You Were Diagnosed With   
  
 Codes Comments Health care maintenance    -  Primary ICD-10-CM: Z00.00 ICD-9-CM: V70.0 Lipid disorder     ICD-10-CM: E78.9 ICD-9-CM: 272.9 Age appropriate mental status     ICD-10-CM: Z68.5 ICD-9-CM: V49.89 Stage 3 chronic kidney disease     ICD-10-CM: N18.3 ICD-9-CM: 585.3 Candis Smith Gout, unspecified cause, unspecified chronicity, unspecified site     ICD-10-CM: M10.9 ICD-9-CM: 274.9 Prostate CA [facility])     ICD-10-CM: B92 ICD-9-CM: 743 Thalassemia minor     ICD-10-CM: D56.3 ICD-9-CM: 282.46 Essential hypertension     ICD-10-CM: I10 
ICD-9-CM: 401.9 Screening for depression     ICD-10-CM: Z13.89 ICD-9-CM: V79.0 Fall (on) (from) other stairs and steps, initial encounter     ICD-10-CM: W10. Michael Cortez ICD-9-CM: E880.9 Vitals BP Pulse Temp Resp Height(growth percentile) Weight(growth percentile) 128/77 (BP 1 Location: Right arm, BP Patient Position: Sitting) 62 97.7 °F (36.5 °C) (Oral) 18 5' 5\" (1.651 m) 153 lb (69.4 kg) SpO2 BMI Smoking Status 99% 25.46 kg/m2 Never Smoker BMI and BSA Data Body Mass Index Body Surface Area  
 25.46 kg/m 2 1.78 m 2 Preferred Pharmacy Pharmacy Name Phone Gabriella Negron, [facility] [phone] Your Updated Medication List  
  
   
This list is accurate as of 4/12/18  8:57 AM.  Always use your most recent med list.  
  
  
  
  
 allopurinol 300 mg tablet Commonly known as:  ZYLOPRIM  
TAKE 1 TABLET DAILY  
  
 amLODIPine 5 mg tablet Commonly known as:  Job Dub TAKE 1 TABLET DAILY (NEED OFFICE VISIT) metoprolol succinate 25 mg XL tablet Commonly known as:  TOPROL-XL Take 1 Tab by mouth daily. Prescriptions Sent to Pharmacy Refills  
 metoprolol succinate (TOPROL-XL) 25 mg XL tablet 3 Sig: Take 1 Tab by mouth daily. Class: Normal  
 Pharmacy: [address] [place] [address], [address] [place] [address] Ph #: [phone] Route: Oral  
  
We Performed the Following HGB & HCT [29410 CPT(R)] LIPID PANEL [18753 CPT(R)] METABOLIC PANEL, COMPREHENSIVE [43322 CPT(R)] [address] [facility] [ \[facility] [place] [facility]\""] ME FALLS RISK ASSESSMENT DOCUMENTED [0101X CPT(R)] PSA, DIAGNOSTIC (PROSTATE SPECIFIC AG) [id number] CPT(R)] Follow-up Instructions Return in about 1 year (around 4/12/2019). Patient Instructions Labs today Stop atenolol and start metoprolol one pill daily (when this is available) Recheck BP in 2-4 weeks Stay active Do no fall Introducing [facility] SERVICES! Dear Denise Clark: Thank you for requesting a [employer] account. Our records indicate that you already have an active [employer] account. You can access your account anytime at [email or web address]. [email or web address] Did you know that you can access your hospital and ER discharge instructions at any time in [employer]? You can also review all of your test results from your hospital stay or ER visit. Additional Information If you have questions, please visit the Frequently Asked Questions section of the [employer] website at [email or web address] Remember, [employer] is NOT to be used for urgent needs. For medical emergencies, dial 911. Now available from your iPhone and Android! Please provide this summary of care documentation to your next provider. Your primary care clinician is listed as Asad Villareal. If you have any questions after today's visit, please call [phone].

## 2018-04-12 NOTE — ACP (ADVANCE CARE PLANNING)
discussed with lucid patient 4/12/2018:  FULL CODE.   Isai Loera would be decision-maker if needed [phone]

## 2018-04-12 NOTE — PROGRESS NOTES
1. Have you been to the ER, urgent care clinic since your last visit? Hospitalized since your last visit? Yes, [facility], Dec. 2017, elbow injury    2. Have you seen or consulted any other health care providers outside of the [address] [place] [address] since your last visit? Include any pap smears or colon screening. No    Chief Complaint   Patient presents with    Medication Refill     B/P med    Other     Lab work     Patient states had influenza at [facility]. Blood pressure 128/77, pulse 62, temperature 97.7 °F (36.5 °C), temperature source Oral, resp. rate 18, height 5' 5\" (1.651 m), weight 153 lb (69.4 kg), SpO2 99 %.

## 2018-04-13 LAB
ALBUMIN SERPL-MCNC: 4.4 G/DL (ref 3.5–4.8)
ALBUMIN/GLOB SERPL: 2 {RATIO} (ref 1.2–2.2)
ALP SERPL-CCNC: 48 IU/L (ref 39–117)
ALT SERPL-CCNC: 15 IU/L (ref 0–44)
AST SERPL-CCNC: 18 IU/L (ref 0–40)
BILIRUB SERPL-MCNC: 0.7 MG/DL (ref 0–1.2)
BUN SERPL-MCNC: 37 MG/DL (ref 8–27)
BUN/CREAT SERPL: 26 (ref 10–24)
CALCIUM SERPL-MCNC: 9.8 MG/DL (ref 8.6–10.2)
CHLORIDE SERPL-SCNC: 101 MMOL/L (ref 96–106)
CHOLEST SERPL-MCNC: 191 MG/DL (ref 100–199)
CO2 SERPL-SCNC: 25 MMOL/L (ref 18–29)
CREAT SERPL-MCNC: 1.45 MG/DL (ref 0.76–1.27)
GFR SERPLBLD CREATININE-BSD FMLA CKD-EPI: 46 ML/MIN/1.73
GFR SERPLBLD CREATININE-BSD FMLA CKD-EPI: 53 ML/MIN/1.73
GLOBULIN SER CALC-MCNC: 2.2 G/DL (ref 1.5–4.5)
GLUCOSE SERPL-MCNC: 98 MG/DL (ref 65–99)
HCT VFR BLD AUTO: 33.5 % (ref 37.5–51)
HDLC SERPL-MCNC: 60 MG/DL
HGB BLD-MCNC: 10.2 G/DL (ref 13–17.7)
INTERPRETATION, 910389: NORMAL
INTERPRETATION: NORMAL
LDLC SERPL CALC-MCNC: 116 MG/DL (ref 0–99)
PDF IMAGE, 910387: NORMAL
POTASSIUM SERPL-SCNC: 4.7 MMOL/L (ref 3.5–5.2)
PROT SERPL-MCNC: 6.6 G/DL (ref 6–8.5)
PSA SERPL-MCNC: <0.1 NG/ML (ref 0–4)
SODIUM SERPL-SCNC: 142 MMOL/L (ref 134–144)
TRIGL SERPL-MCNC: 75 MG/DL (ref 0–149)
VLDLC SERPL CALC-MCNC: 15 MG/DL (ref 5–40)

## 2018-09-29 RX ORDER — ALLOPURINOL 300 MG/1
TABLET ORAL
Qty: 90 TAB | Refills: 2 | Status: SHIPPED | OUTPATIENT
Start: 2018-09-29

## 2020-03-27 PROBLEM — Z85.46 HISTORY OF PROSTATE CANCER: Status: ACTIVE | Noted: 2020-03-27

## 2022-03-19 PROBLEM — Z85.46 HISTORY OF PROSTATE CANCER: Status: ACTIVE | Noted: 2020-03-27

## 2022-03-19 PROBLEM — Z78.9: Status: ACTIVE | Noted: 2017-01-17

## 2022-03-19 PROBLEM — E78.9 LIPID DISORDER: Status: ACTIVE | Noted: 2017-01-18

## 2025-06-17 ENCOUNTER — OFFICE VISIT (OUTPATIENT)
Age: 85
End: 2025-06-17

## 2025-06-17 VITALS
WEIGHT: 134 LBS | SYSTOLIC BLOOD PRESSURE: 139 MMHG | TEMPERATURE: 98.7 F | HEART RATE: 65 BPM | DIASTOLIC BLOOD PRESSURE: 74 MMHG | OXYGEN SATURATION: 98 % | RESPIRATION RATE: 14 BRPM

## 2025-06-17 DIAGNOSIS — L30.9 ACUTE DERMATITIS: Primary | ICD-10-CM

## 2025-06-17 RX ORDER — ALLOPURINOL 300 MG/1
300 TABLET ORAL DAILY
COMMUNITY
Start: 2025-05-09

## 2025-06-17 RX ORDER — AMLODIPINE BESYLATE 5 MG/1
5 TABLET ORAL DAILY
COMMUNITY
Start: 2025-05-29

## 2025-06-17 RX ORDER — METOPROLOL SUCCINATE 25 MG/1
25 TABLET, EXTENDED RELEASE ORAL DAILY
COMMUNITY
Start: 2025-04-06

## 2025-06-17 NOTE — PROGRESS NOTES
2025   Rhys Perkins (: 1940) is a 84 y.o. male, New patient, here for evaluation of the following chief complaint(s):  Rash (C/o a bee sting (last Thursday) that has developed into a rash on the right arm. )       ASSESSMENT/PLAN:  Below is the assessment and plan developed based on review of pertinent history, physical exam, labs, studies, and medications.  1. Acute dermatitis       X-rays were viewed and preliminarily interpreted by this provider.      Handout given with care instructions  Pt with stable VS, non-toxic appearing  Pt in no acute distress and afebrile   4.   OTC for symptom management. Increase fluid intake, ensure adequate nutritional intake.  5.   Follow up with PCP as needed.  6.   Go to ED with development of any acute symptoms.     Follow up:  Return if symptoms worsen or fail to improve.  Follow up immediately for any new, worsening or changes or if symptoms are not improving over the next 5-7 days.     SUBJECTIVE/OBJECTIVE:  HPI       Rash (C/o a bee sting (last Thursday) that has developed into a rash on the right arm. )        No results found for any visits on 25.        Review of Systems    Asthma -  COPD -   Smoke -  Vape -      Physical Exam   Vitals:    25 0904 25 0909   BP: (!) 144/79 139/74   BP Site: Left Upper Arm    Patient Position: Sitting    BP Cuff Size: Large Adult    Pulse: 65    Resp: 14    Temp: 98.7 °F (37.1 °C)    TempSrc: Oral    SpO2: 98%    Weight: 60.8 kg (134 lb)         Allergies   Allergen Reactions    Lisinopril Other (See Comments)     Increase creatinine    Probenecid Other (See Comments)     decrease renal function       Current Outpatient Medications   Medication Sig Dispense Refill    allopurinol (ZYLOPRIM) 300 MG tablet Take 1 tablet by mouth daily      amLODIPine (NORVASC) 5 MG tablet Take 1 tablet by mouth daily      metoprolol succinate (TOPROL XL) 25 MG extended release tablet Take 1 tablet by mouth daily       No

## 2025-06-17 NOTE — PATIENT INSTRUCTIONS
- Cool compresses to arm  - Eucerin or other cream to skin  - Antihistamine twice daily x 3-4 days-  Claritin (loratadine); Zyrtec (cetirizine); Allegra (fexofenadine)

## 2025-07-07 ENCOUNTER — OFFICE VISIT (OUTPATIENT)
Age: 85
End: 2025-07-07

## 2025-07-07 VITALS
HEART RATE: 70 BPM | DIASTOLIC BLOOD PRESSURE: 75 MMHG | RESPIRATION RATE: 18 BRPM | OXYGEN SATURATION: 96 % | SYSTOLIC BLOOD PRESSURE: 123 MMHG | HEIGHT: 66 IN | TEMPERATURE: 97.6 F | WEIGHT: 140 LBS | BODY MASS INDEX: 22.5 KG/M2

## 2025-07-07 DIAGNOSIS — S80.822A BLISTER (NONTHERMAL), LEFT LOWER LEG, INITIAL ENCOUNTER: Primary | ICD-10-CM

## 2025-07-07 RX ORDER — MUPIROCIN 2 %
OINTMENT (GRAM) TOPICAL
Qty: 15 G | Refills: 0 | Status: SHIPPED | OUTPATIENT
Start: 2025-07-07 | End: 2025-07-14

## 2025-07-07 RX ORDER — CEPHALEXIN 500 MG/1
500 CAPSULE ORAL 2 TIMES DAILY
Qty: 14 CAPSULE | Refills: 0 | Status: SHIPPED | OUTPATIENT
Start: 2025-07-07 | End: 2025-07-14

## 2025-07-07 NOTE — PROGRESS NOTES
2025   Rhys Perkins (: 1940) is a 84 y.o. male, Established patient, here for evaluation of the following chief complaint(s):  Insect Bite (Pt c/o bee sting on left foot onset 7/3/25/Symptoms include: Redness, blister and raw skin, bleeding )       ASSESSMENT/PLAN:  Below is the assessment and plan developed based on review of pertinent history, physical exam, labs, studies, and medications.  1. Blister (nonthermal), left lower leg, initial encounter    - Keflex  - Bactroban    Xeroform applied to both sites.  Pt to treat with Xeroform for 2-3 days then use bactroban to area to prevent infection.  Recommend f/u with PCP or here in 1-2 days for wound re-check.      Handout given with care instructions  Pt with stable VS, non-toxic appearing  Pt in no acute distress and afebrile   4.   OTC for symptom management. Increase fluid intake, ensure adequate nutritional intake.  5.   Follow up with PCP as needed.  6.   Go to ED with development of any acute symptoms.     Follow up:  Return in about 2 days (around 2025) for wound re-check.  Follow up immediately for any new, worsening or changes or if symptoms are not improving over the next 5-7 days.     SUBJECTIVE/OBJECTIVE:  HPI       Insect Bite (Pt c/o bee sting on left foot onset 7/3/25/Symptoms include: Redness, blister and raw skin, bleeding )  Pt says he was stung by hornet in his heel and then developed blisters on either side of his ankle.  One blister ruptured.  He is concerned about infection.  He has used OTC antibacterially spray.        Review of Systems   Constitutional:  Negative for chills, fatigue and fever.   Musculoskeletal:  Positive for myalgias.   Skin:  Positive for rash and wound.         Physical Exam  Musculoskeletal:        Feet:    Feet:      Left foot:      Skin integrity: Blister, skin breakdown and erythema present. No ulcer or warmth.      Comments: Medial ankle c/w skin unroofed from ruptured blister and intact blister